# Patient Record
Sex: FEMALE | Race: BLACK OR AFRICAN AMERICAN | NOT HISPANIC OR LATINO | URBAN - METROPOLITAN AREA
[De-identification: names, ages, dates, MRNs, and addresses within clinical notes are randomized per-mention and may not be internally consistent; named-entity substitution may affect disease eponyms.]

---

## 2022-07-13 ENCOUNTER — EMERGENCY (EMERGENCY)
Facility: HOSPITAL | Age: 42
LOS: 1 days | Discharge: HOME | End: 2022-07-13
Attending: EMERGENCY MEDICINE | Admitting: EMERGENCY MEDICINE

## 2022-07-13 VITALS
OXYGEN SATURATION: 97 % | HEART RATE: 71 BPM | SYSTOLIC BLOOD PRESSURE: 106 MMHG | TEMPERATURE: 98 F | RESPIRATION RATE: 18 BRPM | DIASTOLIC BLOOD PRESSURE: 70 MMHG

## 2022-07-13 VITALS
SYSTOLIC BLOOD PRESSURE: 122 MMHG | DIASTOLIC BLOOD PRESSURE: 63 MMHG | RESPIRATION RATE: 18 BRPM | OXYGEN SATURATION: 100 % | TEMPERATURE: 99 F | HEART RATE: 79 BPM

## 2022-07-13 LAB
ALBUMIN SERPL ELPH-MCNC: 4.6 G/DL — SIGNIFICANT CHANGE UP (ref 3.3–5)
ALP SERPL-CCNC: 46 U/L — SIGNIFICANT CHANGE UP (ref 40–120)
ALT FLD-CCNC: 21 U/L — SIGNIFICANT CHANGE UP (ref 4–33)
ANION GAP SERPL CALC-SCNC: 11 MMOL/L — SIGNIFICANT CHANGE UP (ref 7–14)
APPEARANCE UR: CLEAR — SIGNIFICANT CHANGE UP
AST SERPL-CCNC: 19 U/L — SIGNIFICANT CHANGE UP (ref 4–32)
BACTERIA # UR AUTO: NEGATIVE — SIGNIFICANT CHANGE UP
BASOPHILS # BLD AUTO: 0.02 K/UL — SIGNIFICANT CHANGE UP (ref 0–0.2)
BASOPHILS NFR BLD AUTO: 0.4 % — SIGNIFICANT CHANGE UP (ref 0–2)
BILIRUB SERPL-MCNC: 0.5 MG/DL — SIGNIFICANT CHANGE UP (ref 0.2–1.2)
BILIRUB UR-MCNC: NEGATIVE — SIGNIFICANT CHANGE UP
BLD GP AB SCN SERPL QL: NEGATIVE — SIGNIFICANT CHANGE UP
BUN SERPL-MCNC: 14 MG/DL — SIGNIFICANT CHANGE UP (ref 7–23)
CALCIUM SERPL-MCNC: 9 MG/DL — SIGNIFICANT CHANGE UP (ref 8.4–10.5)
CHLORIDE SERPL-SCNC: 107 MMOL/L — SIGNIFICANT CHANGE UP (ref 98–107)
CO2 SERPL-SCNC: 23 MMOL/L — SIGNIFICANT CHANGE UP (ref 22–31)
COLOR SPEC: YELLOW — SIGNIFICANT CHANGE UP
CREAT SERPL-MCNC: 0.75 MG/DL — SIGNIFICANT CHANGE UP (ref 0.5–1.3)
DIFF PNL FLD: NEGATIVE — SIGNIFICANT CHANGE UP
EGFR: 103 ML/MIN/1.73M2 — SIGNIFICANT CHANGE UP
EOSINOPHIL # BLD AUTO: 0.01 K/UL — SIGNIFICANT CHANGE UP (ref 0–0.5)
EOSINOPHIL NFR BLD AUTO: 0.2 % — SIGNIFICANT CHANGE UP (ref 0–6)
EPI CELLS # UR: 2 /HPF — SIGNIFICANT CHANGE UP (ref 0–5)
GLUCOSE SERPL-MCNC: 88 MG/DL — SIGNIFICANT CHANGE UP (ref 70–99)
GLUCOSE UR QL: NEGATIVE — SIGNIFICANT CHANGE UP
HCG SERPL-ACNC: 285.8 MIU/ML — SIGNIFICANT CHANGE UP
HCT VFR BLD CALC: 41.1 % — SIGNIFICANT CHANGE UP (ref 34.5–45)
HGB BLD-MCNC: 12.7 G/DL — SIGNIFICANT CHANGE UP (ref 11.5–15.5)
HYALINE CASTS # UR AUTO: 0 /LPF — SIGNIFICANT CHANGE UP (ref 0–7)
IANC: 2.53 K/UL — SIGNIFICANT CHANGE UP (ref 1.8–7.4)
IMM GRANULOCYTES NFR BLD AUTO: 0.4 % — SIGNIFICANT CHANGE UP (ref 0–1.5)
KETONES UR-MCNC: NEGATIVE — SIGNIFICANT CHANGE UP
LEUKOCYTE ESTERASE UR-ACNC: NEGATIVE — SIGNIFICANT CHANGE UP
LYMPHOCYTES # BLD AUTO: 1.75 K/UL — SIGNIFICANT CHANGE UP (ref 1–3.3)
LYMPHOCYTES # BLD AUTO: 38 % — SIGNIFICANT CHANGE UP (ref 13–44)
MCHC RBC-ENTMCNC: 28.9 PG — SIGNIFICANT CHANGE UP (ref 27–34)
MCHC RBC-ENTMCNC: 30.9 GM/DL — LOW (ref 32–36)
MCV RBC AUTO: 93.4 FL — SIGNIFICANT CHANGE UP (ref 80–100)
MONOCYTES # BLD AUTO: 0.27 K/UL — SIGNIFICANT CHANGE UP (ref 0–0.9)
MONOCYTES NFR BLD AUTO: 5.9 % — SIGNIFICANT CHANGE UP (ref 2–14)
NEUTROPHILS # BLD AUTO: 2.53 K/UL — SIGNIFICANT CHANGE UP (ref 1.8–7.4)
NEUTROPHILS NFR BLD AUTO: 55.1 % — SIGNIFICANT CHANGE UP (ref 43–77)
NITRITE UR-MCNC: NEGATIVE — SIGNIFICANT CHANGE UP
NRBC # BLD: 0 /100 WBCS — SIGNIFICANT CHANGE UP
NRBC # FLD: 0 K/UL — SIGNIFICANT CHANGE UP
PH UR: 6 — SIGNIFICANT CHANGE UP (ref 5–8)
PLATELET # BLD AUTO: 234 K/UL — SIGNIFICANT CHANGE UP (ref 150–400)
POTASSIUM SERPL-MCNC: 4.1 MMOL/L — SIGNIFICANT CHANGE UP (ref 3.5–5.3)
POTASSIUM SERPL-SCNC: 4.1 MMOL/L — SIGNIFICANT CHANGE UP (ref 3.5–5.3)
PROT SERPL-MCNC: 7.3 G/DL — SIGNIFICANT CHANGE UP (ref 6–8.3)
PROT UR-MCNC: ABNORMAL
RBC # BLD: 4.4 M/UL — SIGNIFICANT CHANGE UP (ref 3.8–5.2)
RBC # FLD: 12.7 % — SIGNIFICANT CHANGE UP (ref 10.3–14.5)
RBC CASTS # UR COMP ASSIST: 6 /HPF — HIGH (ref 0–4)
RH IG SCN BLD-IMP: POSITIVE — SIGNIFICANT CHANGE UP
SODIUM SERPL-SCNC: 141 MMOL/L — SIGNIFICANT CHANGE UP (ref 135–145)
SP GR SPEC: 1.03 — SIGNIFICANT CHANGE UP (ref 1–1.05)
UROBILINOGEN FLD QL: ABNORMAL
WBC # BLD: 4.6 K/UL — SIGNIFICANT CHANGE UP (ref 3.8–10.5)
WBC # FLD AUTO: 4.6 K/UL — SIGNIFICANT CHANGE UP (ref 3.8–10.5)
WBC UR QL: 1 /HPF — SIGNIFICANT CHANGE UP (ref 0–5)

## 2022-07-13 PROCEDURE — 76830 TRANSVAGINAL US NON-OB: CPT | Mod: 26

## 2022-07-13 PROCEDURE — 99283 EMERGENCY DEPT VISIT LOW MDM: CPT | Mod: GC

## 2022-07-13 PROCEDURE — 99285 EMERGENCY DEPT VISIT HI MDM: CPT

## 2022-07-13 RX ORDER — ACETAMINOPHEN 500 MG
650 TABLET ORAL ONCE
Refills: 0 | Status: COMPLETED | OUTPATIENT
Start: 2022-07-13 | End: 2022-07-13

## 2022-07-13 RX ADMIN — Medication 650 MILLIGRAM(S): at 10:02

## 2022-07-13 NOTE — ED PROVIDER NOTE - OBJECTIVE STATEMENT
41F no signif PMH, presents to the ED with R sided pelvic/suprapubic pain that started 3 days ago, associated with one episode vaginal spotting 3 days ago, none since. Pts LMP 5/31, upreg positive at urine test done in the bathroom in the ED 10 minutes prior to exam. Pt took tylenol last night with mild relief. Denies any fevers/chills, uri sxs, cough, chest pain, lightheadedness, palpitations, n/v/d, dark/bloody stools, urinary sxs. No  active vaginal bleeding at this time, denies vaginal discharge.

## 2022-07-13 NOTE — ED PROVIDER NOTE - NSFOLLOWUPINSTRUCTIONS_ED_ALL_ED_FT
- Patient to follow up in 48 hours for repeat b-HCG in clinic (information below). If you are unable to follow up in the clinic, please return to the Emergency Department to have your testing done in 48 hours (on 7/15).     [facility]  [facility] [place] [address]  [phone]    - Be sure to return to the ED if you develop new or worsening symptoms. Specific signs and symptoms to be vigilant of: fever or chills, chest pain, difficulty breathing, palpitations, lightheadedness or loss of consciousness, significant vaginal bleeding, abdominal pain, or any other distressing symptoms.

## 2022-07-13 NOTE — CONSULT NOTE ADULT - ASSESSMENT
Differential includes threatened AB vs. ectopic pregnancy vs. viable IUP vs. spontaneous  in progress.  Difficult to assess at this time.      - Patient to follow up in 48 hours for repeat b-HCG in clinic... in the ER  - Rh type:  positive.    No need for rhogam at this time.   - Ectopic precautions reviewed with patient.  Discussed with patient importance of follow up for b-HCG given unknown pregnancy location at this time.  Patient expressed understanding.  All questions and concerns addressed to patient's apparent satisfaction.   - Patient to be added to GYN b-HCG list for closer follow up.    - Patient stable for d/c home from GYN perspective.  Primary management per ED team.      Jaz Reynolds PGY3 41y  LMP  presents with 3 days of abdominal cramping, spotting x1 day and positive pregnancy test today. Upon arrival to ED VS stable, bHCG 285, and TVUS with thickened endometrial lining and pregnancy of unknown location. Differential includes threatened AB vs. ectopic pregnancy vs. viable IUP vs. spontaneous  in progress.  Difficult to assess at this time.      - Patient to follow up in 48 hours for repeat b-HCG in clinic.   - Rh type: positive.  No need for rhogam at this time.   - Ectopic precautions reviewed with patient.  Discussed with patient importance of follow up for b-HCG given unknown pregnancy location at this time.  Patient expressed understanding.  All questions and concerns addressed to patient's apparent satisfaction.   - Patient to be added to GYN b-HCG list for closer follow up.    - Patient stable for d/c home from GYN perspective.  Primary management per ED team.      [facility]  Oncology [facility], [facility]  [address] [address]  [phone]    Seen and d/w Dr. Berenice Reynolds PGY3     41y  LMP  presents with 3 days of abdominal cramping, spotting x1 day and positive pregnancy test today. Upon arrival to ED VS stable, bHCG 285, and TVUS with thickened endometrial lining and pregnancy of unknown location. Differential includes threatened AB vs. ectopic pregnancy vs. viable IUP vs. spontaneous  in progress.  Difficult to assess at this time.      - Patient to follow up in 48 hours for repeat b-HCG in clinic.   - Rh type: positive.  No need for rhogam at this time.   - Ectopic precautions reviewed with patient.  Discussed with patient importance of follow up for b-HCG given unknown pregnancy location at this time.  Patient expressed understanding.  All questions and concerns addressed to patient's apparent satisfaction.   - Patient to be added to GYN b-HCG list for closer follow up.    - Patient stable for d/c home from GYN perspective.  Primary management per ED team.      [facility]  Oncology [facility], [facility]  [address] [address]  [phone]    Seen and d/w Dr. Berenice Reynolds PGY3

## 2022-07-13 NOTE — ED PROVIDER NOTE - PATIENT PORTAL LINK FT
You can access the FollowMyHealth Patient Portal offered by [facility] by registering at the following website: [email or web address]. By joining [employer]’s FollowMyHealth portal, you will also be able to view your health information using other applications (apps) compatible with our system.

## 2022-07-13 NOTE — ED PROVIDER NOTE - PROGRESS NOTE DETAILS
Kendall, Med Tox Fellow: US without confirmed IUP, pts hcg in 200s, paged OB to put pt on beta list Kendall, Med Tox Fellow: ob aware, to see pt then pt tbdc

## 2022-07-13 NOTE — ED PROVIDER NOTE - CARE PLAN
1 Principal Discharge DX:	Pain pelvic   Principal Discharge DX:	Pregnancy of unknown anatomic location

## 2022-07-13 NOTE — ED PROVIDER NOTE - ATTENDING CONTRIBUTION TO CARE
I performed a face-to-face evaluation of the patient and performed a history and physical examination. I agree with the history and physical examination. If this was a PA visit, I personally saw the patient with the PA and performed a substantive portion of the visit including all aspects of the medical decision making.    For the last several days, patient has had pain just to the right of a suprapubic area. No symptoms of pregnancy. No  symptoms. No known fibroids or ovarian cyst. No fever/vomiting/diarrhea. Mild tenderness just to the right of the suprapubic area. Will check your in for pregnancy and UTI and renal stone. Check transvaginal ultrasound. If unremarkable, consider CT to look for appendicitis. I performed a face-to-face evaluation of the patient and performed a history and physical examination. I agree with the history and physical examination. If this was a PA visit, I personally saw the patient with the PA and performed a substantive portion of the visit including all aspects of the medical decision making.    For the last several days, patient has had pain just to the right of a suprapubic area. No symptoms of pregnancy. No  symptoms. No known fibroids or ovarian cyst. No fever/vomiting/diarrhea. Mild tenderness just to the right of the suprapubic area. Will check your in for pregnancy and UTI and renal stone. Check HCG and transvaginal ultrasound. If unremarkable, consider CT to look for appendicitis.

## 2022-07-13 NOTE — ED ADULT TRIAGE NOTE - CHIEF COMPLAINT QUOTE
Pt c/o RLQ pain describes as cramping. Denies urinary symptoms, n/v/d. Reports LMP "end of may". Denies PMHX/Meds.

## 2022-07-13 NOTE — CONSULT NOTE ADULT - SUBJECTIVE AND OBJECTIVE BOX
GYN Consult Note    41y G_P_ presents with   HPI:      OB/GYN HISTORY:   G1:   G2:     Last Menstrual Period    Name of GYN Physician:  Date of Last Pap:  History of Abnormal Pap:  Date of Last Mammogram:  Date of Last Colonoscopy:  Current OCP use:     PAST MEDICAL & SURGICAL HISTORY:      REVIEW OF SYSTEMS  General: denies fevers, chills, tiredness  Skin/Breast: denies breast pain  Respiratory and Thorax: denies shortness of breath, denies cough  Cardiovascular: denies chest pain and denies palpitations  Gastrointestinal: denies abdominal pain, nausea/ vomiting	  Genitourinary: denies dysuria, increased urinary frequency, urgency	  Constitutional, Cardiovascular, Respiratory, Gastrointestinal, Genitourinary, Musculoskeletal and Integumentary review of systems are normal except as noted. 	    MEDICATIONS  (STANDING):    MEDICATIONS  (PRN):      Allergies    No Known Allergies    Intolerances        SOCIAL HISTORY:    FAMILY HISTORY:      Vital Signs Last 24 Hrs  T(C): 36.9 (2022 13:09), Max: 37.1 (2022 07:57)  T(F): 98.4 (2022 13:09), Max: 98.7 (2022 07:57)  HR: 71 (2022 13:09) (71 - 79)  BP: 106/70 (2022 13:09) (106/70 - 122/63)  BP(mean): --  RR: 18 (2022 13:09) (18 - 18)  SpO2: 97% (2022 13:09) (97% - 100%)    Parameters below as of 2022 13:09  Patient On (Oxygen Delivery Method): room air        PHYSICAL EXAM:   Gen: NAD, alert and oriented x 3  Cardiovascular: regular   Respiratory: breathing comfortably on RA  Abd: soft, non tender, non-distended  Pelvic: closed/long, no CMT, Uterus: normal size, non tender  Adnexa: non tender, no palpable masses  Extremities: NTBL  Skin: warm and well perfused      LABS:                        12.7   4.60  )-----------( 234      ( 2022 10:30 )             41.1     07-13    141  |  107  |  14  ----------------------------<  88  4.1   |  23  |  0.75    Ca    9.0      2022 10:30    TPro  7.3  /  Alb  4.6  /  TBili  0.5  /  DBili  x   /  AST  19  /  ALT  21  /  AlkPhos  46  07-13      Urinalysis Basic - ( 2022 10:11 )    Color: Yellow / Appearance: Clear / S.032 / pH: x  Gluc: x / Ketone: Negative  / Bili: Negative / Urobili: 3 mg/dL   Blood: x / Protein: Trace / Nitrite: Negative   Leuk Esterase: Negative / RBC: 6 /HPF / WBC 1 /HPF   Sq Epi: x / Non Sq Epi: 2 /HPF / Bacteria: Negative        RADIOLOGY & ADDITIONAL STUDIES: GYN Consult Note    HPI:  41y  LMP  presents with 3 days of abdominal cramping, spotting x1 day and positive pregnancy test today. The patient denies any fevers, headaches, CP, SOB, abdominal pain, N/V, dysuria and edema    OB/GYN HISTORY:    x4  eTOP x1, SAB x1, ectopic x1    Name of GYN Physician: Doesn't have   Denies known fibroids, cysts, abnormal paps, STIs     PAST MEDICAL & SURGICAL HISTORY:  Breast biopsy - benign     REVIEW OF SYSTEMS  General: denies fevers, chills, tiredness  Skin/Breast: denies breast pain  Respiratory and Thorax: denies shortness of breath, denies cough  Cardiovascular: denies chest pain and denies palpitations  Gastrointestinal: +abd cramping denies abdominal pain, nausea/ vomiting	  Genitourinary: denies dysuria, increased urinary frequency, urgency	  Constitutional, Cardiovascular, Respiratory, Gastrointestinal, Genitourinary, Musculoskeletal and Integumentary review of systems are normal except as noted. 	    MEDICATIONS: None     Allergies  No Known Allergies    SOCIAL HISTORY:  Denies toxic habits, anxiety and depression       Vital Signs Last 24 Hrs  T(C): 36.9 (2022 13:09), Max: 37.1 (2022 07:57)  T(F): 98.4 (2022 13:09), Max: 98.7 (2022 07:57)  HR: 71 (2022 13:09) (71 - 79)  BP: 106/70 (2022 13:09) (106/70 - 122/63)  BP(mean): --  RR: 18 (2022 13:09) (18 - 18)  SpO2: 97% (2022 13:09) (97% - 100%)    Parameters below as of 2022 13:09  Patient On (Oxygen Delivery Method): room air        PHYSICAL EXAM:   Gen: NAD, alert and oriented x 3  Cardiovascular: regular   Respiratory: breathing comfortably on RA  Abd: soft, non tender, non-distended  Pelvic: closed/long, no CMT, Uterus: normal size, non tender  Adnexa: non tender, no palpable masses  Extremities: NTBL  Skin: warm and well perfused      LABS:                        12.7   4.60  )-----------( 234      ( 2022 10:30 )             41.1     07-13    141  |  107  |  14  ----------------------------<  88  4.1   |  23  |  0.75    Ca    9.0      2022 10:30    TPro  7.3  /  Alb  4.6  /  TBili  0.5  /  DBili  x   /  AST  19  /  ALT  21  /  AlkPhos  46  07-13      Urinalysis Basic - ( 2022 10:11 )    Color: Yellow / Appearance: Clear / S.032 / pH: x  Gluc: x / Ketone: Negative  / Bili: Negative / Urobili: 3 mg/dL   Blood: x / Protein: Trace / Nitrite: Negative   Leuk Esterase: Negative / RBC: 6 /HPF / WBC 1 /HPF   Sq Epi: x / Non Sq Epi: 2 /HPF / Bacteria: Negative    Type + Screen (22 @ 10:29)    ABO Interpretation: AB    Rh Interpretation: Positive    Antibody Screen: Negative      HCG Quantitative, Serum (22 @ 10:30)    HCG Quantitative, Serum: 285.8: For pregnancy evaluation the reference values are as follows:  Negative: <5 mIU/mL  Indeterminate: 5-25 mIU/mL (suggest repeat testing in 72hrs)  Positive: >25 mIU/mL  Note: hCG results of false positive and false negative for pregnancy are  rare, but can occur with this, and other hCG tests. Fabiana- and  post-menopausal females may have mildly elevated hCG concentrations,  usually less than 14 mIU/mL, that are constant over time.  Weeks of pregnancy:           mIU/mL  ------------------         -------          3                                6 -      71          4                              10 -     750          5                             220 -   7,100          6                             160 -  32,000          7                3,700 - 164,000          8                          32,000 - 150,000          9                          64,000 - 151,000         10                         47,000 - 187,000         12                         28,000 - 211,000         14                         14,000 -  63,000         15                         12,000 -  71,000         16 - 18                   8,000 -  58,000 mIU/mL        RADIOLOGY & ADDITIONAL STUDIES:    < from: US Transvaginal (22 @ 12:19) >  IMPRESSION:  No intrauterine or extrauterine gestation is identified, representing a   pregnancy of unknown location. Differential includes an early normal   intrauterine pregnancy, failed pregnancy, or occult ectopic pregnancy.   Continued follow-up with serial hCG and ultrasound is recommended.    Mildly thickened endometrium measuring 18 mm, which may be related to   early pregnancy.    Intramural fibroid measuring 1.7 cm.    Normal left ovary without sonographic evidence of left ovarian torsion.    The right ovary demonstrates normal arterial flow. Discrete venous   waveforms were not identified. Findings may be technical, as the right   ovary was only visualized transabdominally and not transvaginally.      < end of copied text >

## 2022-07-13 NOTE — ED PROVIDER NOTE - CLINICAL SUMMARY MEDICAL DECISION MAKING FREE TEXT BOX
Cathryn: For the last several days, patient has had pain just to the right of a suprapubic area. No symptoms of pregnancy. No  symptoms. No known fibroids or ovarian cyst. No fever/vomiting/diarrhea. Mild tenderness just to the right of the super pubic area. Will check your in for pregnancy and UTI and renal stone. Check transvaginal ultrasound. If unremarkable, consider CT to look for appendicitis. Cathryn: For the last several days, patient has had pain just to the right of a suprapubic area. No symptoms of pregnancy. No  symptoms. No known fibroids or ovarian cyst. No fever/vomiting/diarrhea. Mild tenderness just to the right of the super pubic area. Will check your in for pregnancy and UTI and renal stone. Check transvaginal ultrasound. If unremarkable, consider CT to look for appendicitis.    Vishnu Argueta Tox Fellow: saw pt after attending (pt did urine preg test after attending saw pt), tested positive, never had US confirming IUP, no active vaginal bleeding, will do labs, type, hcg quant, tylenol, TVUS to eval iup/ect. Reassess. Cathryn: For the last several days, patient has had pain just to the right of a suprapubic area. No symptoms of pregnancy. No  symptoms. No known fibroids or ovarian cyst. No fever/vomiting/diarrhea. Mild tenderness just to the right of the super pubic area. Will check your in for pregnancy and UTI and renal stone. Check HCG and transvaginal ultrasound. If unremarkable, consider CT to look for appendicitis.    Vishnu Argueta Tox Fellow: saw pt after attending (pt did urine preg test after attending saw pt), tested positive, never had US confirming IUP, no active vaginal bleeding, will do labs, type, hcg quant, tylenol, TVUS to eval iup/ect. Reassess.

## 2022-07-13 NOTE — ED ADULT NURSE NOTE - OBJECTIVE STATEMENT
pt received in room 9. pt 41y female Axox4 and ambulatory. pt c/o RUQ pain. pt states last menstrual period was end of may. pt denies n/v/d, discharge, bleeding, urinary symptoms. pt took home pregnancy test that came back positive and was concerned so came to ED for further eval. Labs drawn and sent. 20G iv in th LAC. Will continue to monitor.

## 2022-07-13 NOTE — ED ADULT NURSE NOTE - NSIMPLEMENTINTERV_GEN_ALL_ED
Implemented All Universal Safety Interventions:  [place] to call system. Call bell, personal items and telephone within reach. Instruct patient to call for assistance. Room bathroom lighting operational. Non-slip footwear when patient is off stretcher. Physically safe environment: no spills, clutter or unnecessary equipment. Stretcher in lowest position, wheels locked, appropriate side rails in place.

## 2022-07-13 NOTE — ED PROVIDER NOTE - PHYSICAL EXAMINATION
GENERAL: no acute distress, non-toxic appearing  HEENT: normal conjunctiva, oral mucosa moist  CARDIAC: regular rate and regular rhythm  PULM: clear to ascultation bilaterally, no incr wob  GI: abdomen nondistended, soft, nontender  : no CVA tenderness, some R sided suprapubic tenderness  NEURO: alert and oriented x 3, normal speech, moving all extremities without lateralization  MSK: no visible deformities, no peripheral edema  SKIN: no visible rashes  PSYCH: appropriate mood and affect

## 2022-07-14 ENCOUNTER — EMERGENCY (EMERGENCY)
Facility: HOSPITAL | Age: 42
LOS: 1 days | Discharge: HOME | End: 2022-07-14
Admitting: STUDENT IN AN ORGANIZED HEALTH CARE EDUCATION/TRAINING PROGRAM

## 2022-07-14 VITALS
DIASTOLIC BLOOD PRESSURE: 76 MMHG | HEART RATE: 82 BPM | SYSTOLIC BLOOD PRESSURE: 118 MMHG | OXYGEN SATURATION: 100 % | RESPIRATION RATE: 18 BRPM | TEMPERATURE: 97 F

## 2022-07-14 LAB
ALBUMIN SERPL ELPH-MCNC: 4 G/DL — SIGNIFICANT CHANGE UP (ref 3.3–5)
ALP SERPL-CCNC: 49 U/L — SIGNIFICANT CHANGE UP (ref 40–120)
ALT FLD-CCNC: 15 U/L — SIGNIFICANT CHANGE UP (ref 4–33)
ANION GAP SERPL CALC-SCNC: 12 MMOL/L — SIGNIFICANT CHANGE UP (ref 7–14)
APPEARANCE UR: ABNORMAL
AST SERPL-CCNC: 18 U/L — SIGNIFICANT CHANGE UP (ref 4–32)
BASOPHILS # BLD AUTO: 0.03 K/UL — SIGNIFICANT CHANGE UP (ref 0–0.2)
BASOPHILS NFR BLD AUTO: 0.4 % — SIGNIFICANT CHANGE UP (ref 0–2)
BILIRUB SERPL-MCNC: 0.5 MG/DL — SIGNIFICANT CHANGE UP (ref 0.2–1.2)
BILIRUB UR-MCNC: NEGATIVE — SIGNIFICANT CHANGE UP
BUN SERPL-MCNC: 13 MG/DL — SIGNIFICANT CHANGE UP (ref 7–23)
CALCIUM SERPL-MCNC: 9 MG/DL — SIGNIFICANT CHANGE UP (ref 8.4–10.5)
CHLORIDE SERPL-SCNC: 107 MMOL/L — SIGNIFICANT CHANGE UP (ref 98–107)
CO2 SERPL-SCNC: 19 MMOL/L — LOW (ref 22–31)
COLOR SPEC: ABNORMAL
CREAT SERPL-MCNC: 0.7 MG/DL — SIGNIFICANT CHANGE UP (ref 0.5–1.3)
CULTURE RESULTS: SIGNIFICANT CHANGE UP
DIFF PNL FLD: ABNORMAL
EGFR: 112 ML/MIN/1.73M2 — SIGNIFICANT CHANGE UP
EOSINOPHIL # BLD AUTO: 0.03 K/UL — SIGNIFICANT CHANGE UP (ref 0–0.5)
EOSINOPHIL NFR BLD AUTO: 0.4 % — SIGNIFICANT CHANGE UP (ref 0–6)
GLUCOSE SERPL-MCNC: 85 MG/DL — SIGNIFICANT CHANGE UP (ref 70–99)
GLUCOSE UR QL: NEGATIVE — SIGNIFICANT CHANGE UP
HCT VFR BLD CALC: 38.2 % — SIGNIFICANT CHANGE UP (ref 34.5–45)
HGB BLD-MCNC: 12.1 G/DL — SIGNIFICANT CHANGE UP (ref 11.5–15.5)
IANC: 3.7 K/UL — SIGNIFICANT CHANGE UP (ref 1.8–7.4)
IMM GRANULOCYTES NFR BLD AUTO: 0.3 % — SIGNIFICANT CHANGE UP (ref 0–1.5)
KETONES UR-MCNC: ABNORMAL
LEUKOCYTE ESTERASE UR-ACNC: ABNORMAL
LYMPHOCYTES # BLD AUTO: 2.74 K/UL — SIGNIFICANT CHANGE UP (ref 1–3.3)
LYMPHOCYTES # BLD AUTO: 38.5 % — SIGNIFICANT CHANGE UP (ref 13–44)
MCHC RBC-ENTMCNC: 28.9 PG — SIGNIFICANT CHANGE UP (ref 27–34)
MCHC RBC-ENTMCNC: 31.7 GM/DL — LOW (ref 32–36)
MCV RBC AUTO: 91.2 FL — SIGNIFICANT CHANGE UP (ref 80–100)
MONOCYTES # BLD AUTO: 0.6 K/UL — SIGNIFICANT CHANGE UP (ref 0–0.9)
MONOCYTES NFR BLD AUTO: 8.4 % — SIGNIFICANT CHANGE UP (ref 2–14)
NEUTROPHILS # BLD AUTO: 3.7 K/UL — SIGNIFICANT CHANGE UP (ref 1.8–7.4)
NEUTROPHILS NFR BLD AUTO: 52 % — SIGNIFICANT CHANGE UP (ref 43–77)
NITRITE UR-MCNC: NEGATIVE — SIGNIFICANT CHANGE UP
NRBC # BLD: 0 /100 WBCS — SIGNIFICANT CHANGE UP
NRBC # FLD: 0 K/UL — SIGNIFICANT CHANGE UP
PH UR: 6 — SIGNIFICANT CHANGE UP (ref 5–8)
PLATELET # BLD AUTO: 204 K/UL — SIGNIFICANT CHANGE UP (ref 150–400)
POTASSIUM SERPL-MCNC: 3.7 MMOL/L — SIGNIFICANT CHANGE UP (ref 3.5–5.3)
POTASSIUM SERPL-SCNC: 3.7 MMOL/L — SIGNIFICANT CHANGE UP (ref 3.5–5.3)
PROT SERPL-MCNC: 7.6 G/DL — SIGNIFICANT CHANGE UP (ref 6–8.3)
PROT UR-MCNC: ABNORMAL
RBC # BLD: 4.19 M/UL — SIGNIFICANT CHANGE UP (ref 3.8–5.2)
RBC # FLD: 12.8 % — SIGNIFICANT CHANGE UP (ref 10.3–14.5)
SODIUM SERPL-SCNC: 138 MMOL/L — SIGNIFICANT CHANGE UP (ref 135–145)
SP GR SPEC: 1.04 — SIGNIFICANT CHANGE UP (ref 1–1.05)
SPECIMEN SOURCE: SIGNIFICANT CHANGE UP
UROBILINOGEN FLD QL: SIGNIFICANT CHANGE UP
WBC # BLD: 7.12 K/UL — SIGNIFICANT CHANGE UP (ref 3.8–10.5)
WBC # FLD AUTO: 7.12 K/UL — SIGNIFICANT CHANGE UP (ref 3.8–10.5)

## 2022-07-14 PROCEDURE — 76817 TRANSVAGINAL US OBSTETRIC: CPT | Mod: 26

## 2022-07-14 PROCEDURE — 99285 EMERGENCY DEPT VISIT HI MDM: CPT

## 2022-07-14 NOTE — ED PROVIDER NOTE - NSFOLLOWUPINSTRUCTIONS_ED_ALL_ED_FT
Advance activity as tolerated.  Continue all previously prescribed medications as directed unless otherwise instructed.  Practice pelvic rest -- no sexual intercourse, no foreign bodies, no tampons in vagina.  Follow up in the emergency department tomorrow to have repeat hcg checked- bring copies of your results.  Return to the ER for worsening or persistent symptoms, including but not limited to worsening/persistent pain, fevers, heavy vaginal bleeding requiring more than or equal to 2 pads in 1 hour, passing out, and/or ANY NEW OR CONCERNING SYMPTOMS. If you have issues obtaining follow up, please call: [phone] to obtain a doctor or specialist who takes your insurance in your area.  You may call [phone] to make an appointment with the internal medicine clinic. Advance activity as tolerated.  Continue all previously prescribed medications as directed unless otherwise instructed.  Practice pelvic rest -- no sexual intercourse, no foreign bodies, no tampons in vagina.  Follow up in the emergency department on 7/15/22 to have repeat hcg checked- bring copies of your results.  Return to the ER for worsening or persistent symptoms, including but not limited to worsening/persistent pain, fevers, heavy vaginal bleeding requiring more than or equal to 2 pads in 1 hour, passing out, and/or ANY NEW OR CONCERNING SYMPTOMS. If you have issues obtaining follow up, please call: [phone] to obtain a doctor or specialist who takes your insurance in your area.  You may call [phone] to make an appointment with the internal medicine clinic.

## 2022-07-14 NOTE — ED PROVIDER NOTE - PROGRESS NOTE DETAILS
PORFIRIO CANO:  Sono with following impression: "Pregnancy of unknown location. Differential diagnosis includes a   potential early intrauterine gestation, failed gestation or ectopic   pregnancy that is too small to be currently resolved by ultrasound.   Correlate with serial beta hCGs and follow-up pelvic ultrasound as   clinically warranted."  Pt to follow up in ED on 7/15/22 for repeat HCG.  Pt medically stable for discharge.  Strict return precautions given.  Pt to follow up with PMD and OB/GYN (referral list provided).

## 2022-07-14 NOTE — ED PROVIDER NOTE - PATIENT PORTAL LINK FT
You can access the FollowMyHealth Patient Portal offered by [facility] by registering at the following website: [email or web address]. By joining [employer]’s FollowMyHealth portal, you will also be able to view your health information using other applications (apps) compatible with our system.

## 2022-07-14 NOTE — ED PROVIDER NOTE - OBJECTIVE STATEMENT
Pt is a 40 y/o F no pertinent PMHx  LMP 22 presents with 4 days of pelvic cramping and vaginal bleeding today.  Pt reports she had spotting yesterday, which developed into slightly heavier bleeding, requiring no more then 1 pad so far today.  Pt was seen in ED yesterday; had hcg of 285.8, found to be Rh+; and had sono with following impression: "No intrauterine or extrauterine gestation is identified, representing a  pregnancy of unknown location. Differential includes an early normal intrauterine pregnancy, failed pregnancy, or occult ectopic pregnancy. Continued follow-up with serial hCG and ultrasound is recommended. Mildly thickened endometrium measuring 18 mm, which may be related to early pregnancy. Intramural fibroid measuring 1.7 cm. Normal left ovary without sonographic evidence of left ovarian torsion. The right ovary demonstrates normal arterial flow. Discrete venous waveforms were not identified. Findings may be technical, as the right ovary was only visualized transabdominally and not transvaginally."  Pt denies any fevers, chills, chest pain, SOB, n/v/d/c, dysuria, cloudy urine, vaginal discharge.

## 2022-07-14 NOTE — ED PROVIDER NOTE - CLINICAL SUMMARY MEDICAL DECISION MAKING FREE TEXT BOX
Pt is a 42 y/o F no pertinent PMHx  LMP 22 presents with 4 days of pelvic cramping and vaginal bleeding today. -- vaginal bleeding/cramping in pregnancy -- labs, ua, ucx Pt is a 40 y/o F no pertinent PMHx  LMP 22 presents with 4 days of pelvic cramping and vaginal bleeding today. -- vaginal bleeding/cramping in pregnancy -- labs, ua, ucx, TVUS

## 2022-07-14 NOTE — ED ADULT NURSE NOTE - TEMPLATE
Called patient and spoke with his wife Jacki. To let them know that the MRI of the brain w/wo contrast was approved by the insurance company and that [facility] have all the information. Katerin at the facility will be giving them a call to schedule the appointment..   General

## 2022-07-14 NOTE — ED ADULT TRIAGE NOTE - CHIEF COMPLAINT QUOTE
Pt was seen in ER yesterday . Pt was told she is pregnant. Pt states  developed vaginal bleeding today with cramps

## 2022-07-14 NOTE — ED ADULT NURSE NOTE - OBJECTIVE STATEMENT
Pt A&Ox4 ambulatory, presenting to the ED (Intake 13) c/o vaginal bleeding with abdominal cramping. Pt states was discharged from [facility] ED, was told she was pregnant. Pt states developed vaginal spotting after discharge. Pt states around 5am vaginal bleeding increased, states used 1 pad x day. Denies blood clots, cp, sob, palpitations, dizziness, lightheadedness, n/v/d, fever, chills, cough, HA, blurry vision. Respirations are even and unlabored, abdomen soft and nontender, VS noted, 20G IV LAC, labs sent, UA and urine culture sent, pending US, Safety precautions implemented as per protocol, awaiting further MD orders, will continue to monitor.

## 2022-07-14 NOTE — ED PROVIDER NOTE - CARDIAC, MLM
Continue sertraline 25 mg daily Normal rate, regular rhythm.  Heart sounds S1, S2.  No murmurs, rubs or gallops.

## 2022-07-15 ENCOUNTER — EMERGENCY (EMERGENCY)
Facility: HOSPITAL | Age: 42
LOS: 1 days | Discharge: HOME | End: 2022-07-15
Attending: STUDENT IN AN ORGANIZED HEALTH CARE EDUCATION/TRAINING PROGRAM | Admitting: STUDENT IN AN ORGANIZED HEALTH CARE EDUCATION/TRAINING PROGRAM

## 2022-07-15 VITALS
OXYGEN SATURATION: 100 % | RESPIRATION RATE: 20 BRPM | SYSTOLIC BLOOD PRESSURE: 104 MMHG | TEMPERATURE: 98 F | DIASTOLIC BLOOD PRESSURE: 65 MMHG | HEART RATE: 90 BPM

## 2022-07-15 VITALS
HEART RATE: 74 BPM | RESPIRATION RATE: 20 BRPM | TEMPERATURE: 98 F | OXYGEN SATURATION: 100 % | DIASTOLIC BLOOD PRESSURE: 74 MMHG | SYSTOLIC BLOOD PRESSURE: 113 MMHG

## 2022-07-15 VITALS
HEART RATE: 72 BPM | TEMPERATURE: 98 F | RESPIRATION RATE: 18 BRPM | DIASTOLIC BLOOD PRESSURE: 63 MMHG | SYSTOLIC BLOOD PRESSURE: 113 MMHG | OXYGEN SATURATION: 100 %

## 2022-07-15 LAB
ALBUMIN SERPL ELPH-MCNC: 4.2 G/DL — SIGNIFICANT CHANGE UP (ref 3.3–5)
ALP SERPL-CCNC: 52 U/L — SIGNIFICANT CHANGE UP (ref 40–120)
ALT FLD-CCNC: 14 U/L — SIGNIFICANT CHANGE UP (ref 4–33)
ANION GAP SERPL CALC-SCNC: 11 MMOL/L — SIGNIFICANT CHANGE UP (ref 7–14)
APPEARANCE UR: CLEAR — SIGNIFICANT CHANGE UP
AST SERPL-CCNC: 17 U/L — SIGNIFICANT CHANGE UP (ref 4–32)
BASOPHILS # BLD AUTO: 0.03 K/UL — SIGNIFICANT CHANGE UP (ref 0–0.2)
BASOPHILS NFR BLD AUTO: 0.4 % — SIGNIFICANT CHANGE UP (ref 0–2)
BILIRUB SERPL-MCNC: 0.4 MG/DL — SIGNIFICANT CHANGE UP (ref 0.2–1.2)
BILIRUB UR-MCNC: NEGATIVE — SIGNIFICANT CHANGE UP
BUN SERPL-MCNC: 15 MG/DL — SIGNIFICANT CHANGE UP (ref 7–23)
CALCIUM SERPL-MCNC: 9.3 MG/DL — SIGNIFICANT CHANGE UP (ref 8.4–10.5)
CHLORIDE SERPL-SCNC: 107 MMOL/L — SIGNIFICANT CHANGE UP (ref 98–107)
CO2 SERPL-SCNC: 23 MMOL/L — SIGNIFICANT CHANGE UP (ref 22–31)
COLOR SPEC: YELLOW — SIGNIFICANT CHANGE UP
CREAT SERPL-MCNC: 0.71 MG/DL — SIGNIFICANT CHANGE UP (ref 0.5–1.3)
DIFF PNL FLD: ABNORMAL
EGFR: 110 ML/MIN/1.73M2 — SIGNIFICANT CHANGE UP
EOSINOPHIL # BLD AUTO: 0.06 K/UL — SIGNIFICANT CHANGE UP (ref 0–0.5)
EOSINOPHIL NFR BLD AUTO: 0.8 % — SIGNIFICANT CHANGE UP (ref 0–6)
GLUCOSE SERPL-MCNC: 88 MG/DL — SIGNIFICANT CHANGE UP (ref 70–99)
GLUCOSE UR QL: NEGATIVE — SIGNIFICANT CHANGE UP
HCG SERPL-ACNC: 55.4 MIU/ML — SIGNIFICANT CHANGE UP
HCT VFR BLD CALC: 40.7 % — SIGNIFICANT CHANGE UP (ref 34.5–45)
HGB BLD-MCNC: 12.4 G/DL — SIGNIFICANT CHANGE UP (ref 11.5–15.5)
IANC: 4.21 K/UL — SIGNIFICANT CHANGE UP (ref 1.8–7.4)
IMM GRANULOCYTES NFR BLD AUTO: 0.3 % — SIGNIFICANT CHANGE UP (ref 0–1.5)
KETONES UR-MCNC: ABNORMAL
LEUKOCYTE ESTERASE UR-ACNC: NEGATIVE — SIGNIFICANT CHANGE UP
LYMPHOCYTES # BLD AUTO: 2.38 K/UL — SIGNIFICANT CHANGE UP (ref 1–3.3)
LYMPHOCYTES # BLD AUTO: 32.4 % — SIGNIFICANT CHANGE UP (ref 13–44)
MCHC RBC-ENTMCNC: 28.4 PG — SIGNIFICANT CHANGE UP (ref 27–34)
MCHC RBC-ENTMCNC: 30.5 GM/DL — LOW (ref 32–36)
MCV RBC AUTO: 93.1 FL — SIGNIFICANT CHANGE UP (ref 80–100)
MONOCYTES # BLD AUTO: 0.64 K/UL — SIGNIFICANT CHANGE UP (ref 0–0.9)
MONOCYTES NFR BLD AUTO: 8.7 % — SIGNIFICANT CHANGE UP (ref 2–14)
NEUTROPHILS # BLD AUTO: 4.21 K/UL — SIGNIFICANT CHANGE UP (ref 1.8–7.4)
NEUTROPHILS NFR BLD AUTO: 57.4 % — SIGNIFICANT CHANGE UP (ref 43–77)
NITRITE UR-MCNC: NEGATIVE — SIGNIFICANT CHANGE UP
NRBC # BLD: 0 /100 WBCS — SIGNIFICANT CHANGE UP
NRBC # FLD: 0 K/UL — SIGNIFICANT CHANGE UP
PH UR: 6 — SIGNIFICANT CHANGE UP (ref 5–8)
PLATELET # BLD AUTO: 213 K/UL — SIGNIFICANT CHANGE UP (ref 150–400)
POTASSIUM SERPL-MCNC: 4.4 MMOL/L — SIGNIFICANT CHANGE UP (ref 3.5–5.3)
POTASSIUM SERPL-SCNC: 4.4 MMOL/L — SIGNIFICANT CHANGE UP (ref 3.5–5.3)
PROT SERPL-MCNC: 7.8 G/DL — SIGNIFICANT CHANGE UP (ref 6–8.3)
PROT UR-MCNC: ABNORMAL
RBC # BLD: 4.37 M/UL — SIGNIFICANT CHANGE UP (ref 3.8–5.2)
RBC # FLD: 12.6 % — SIGNIFICANT CHANGE UP (ref 10.3–14.5)
SODIUM SERPL-SCNC: 141 MMOL/L — SIGNIFICANT CHANGE UP (ref 135–145)
SP GR SPEC: 1.04 — SIGNIFICANT CHANGE UP (ref 1–1.05)
UROBILINOGEN FLD QL: SIGNIFICANT CHANGE UP
WBC # BLD: 7.34 K/UL — SIGNIFICANT CHANGE UP (ref 3.8–10.5)
WBC # FLD AUTO: 7.34 K/UL — SIGNIFICANT CHANGE UP (ref 3.8–10.5)

## 2022-07-15 PROCEDURE — 76830 TRANSVAGINAL US NON-OB: CPT | Mod: 26

## 2022-07-15 PROCEDURE — 99285 EMERGENCY DEPT VISIT HI MDM: CPT

## 2022-07-15 NOTE — ED PROVIDER NOTE - CLINICAL SUMMARY MEDICAL DECISION MAKING FREE TEXT BOX
40 yo F presenting for bhcg check and repeat US for pregnancy unknown location- gyn paged. dispo per gyn recs

## 2022-07-15 NOTE — ED PROVIDER NOTE - PATIENT PORTAL LINK FT
You can access the FollowMyHealth Patient Portal offered by [facility] by registering at the following website: [email or web address]. By joining [employer]’s FollowMyHealth portal, you will also be able to view your health information using other applications (apps) compatible with our system.

## 2022-07-15 NOTE — ED ADULT NURSE NOTE - OBJECTIVE STATEMENT
pt A&ox4, came to ED for repeat HCG blood work. pt states she was seen in ED this week and was told to come back today for follow up blood work. pt endorses vaginal bleeding but states she has not soaked through 1 pad. pt denies H/A, Dizziness, lightheadedness, and radiating chest pain. breathing is spontaneous and unlabored. sating 99% on RA. bilateral pedal and radial pulses palpable and strong.  right ac 20g IV placed Labs drawn and sent as per ordered. Bed in lowest position, call bell within reach, all other safety and comfort measures provided. awaiting labs results and further orders.

## 2022-07-15 NOTE — CONSULT NOTE ADULT - SUBJECTIVE AND OBJECTIVE BOX
HPI:     41y   Last Menstrual Period 22 presents with abdominal cramping and vaginal spotting at 6weeks and 3 days gestational age. Patient was previously seen on  and  with similar symptoms with workup for PUL completed. Patient was instructed to follow up today for repeat beta HCG. She denies any fevers, headaches, CP, SOB, abdominal pain, N/V, dysuria and edema.    OB/GYN HISTORY:    x4  eTOP x1, SAB x1, ectopic x1    Name of GYN Physician: Doesn't have   Denies known fibroids, cysts, abnormal paps, STIs    PMHX; denies  PSHX; s/p benign breast biopsy  POBHX;  x4  eTOP x1, SAB x1, ectopic x1  PGYNHX: + fibroids denies fibroids,   SOCIAL: denies e/t/d use  Allergies: NKDA or intolerances  MEDS: denies      Vital Signs Last 24 Hrs  T(C): 36.8 (15 Jul 2022 20:49), Max: 36.8 (15 Jul 2022 20:49)  T(F): 98.3 (15 Jul 2022 20:49), Max: 98.3 (15 Jul 2022 20:49)  HR: 74 (15 Jul 2022 20:49) (74 - 90)  BP: 113/74 (15 Jul 2022 20:49) (104/65 - 113/74)  RR: 20 (15 Jul 2022 20:49) (20 - 20)  SpO2: 100% (15 Jul 2022 20:49) (100% - 100%)    Parameters below as of 15 Jul 2022 20:49  Patient On (Oxygen Delivery Method): room air       PHYSICAL EXAM:   Gen: NAD, alert and oriented x 3  Cardiovascular: regular   Respiratory: breathing comfortably on RA  Abd: soft, non tender, non-distended  Pelvic: closed/long, no CMT, minimal blood in vaginal vault   Uterus: normal size, non tender  Adnexa: non tender, no palpable masses  Extremities: NTBL  Skin: warm and well perfused      LABS:  HCG Quantitative, Serum (07.15.22 @ 17:00)   HCG Quantitative, Serum: 55.4: For pregnancy evaluation the reference values are as follows:   Negative: <5 mIU/mL   Indeterminate: 5-25 mIU/mL (suggest repeat testing in 72hrs)   Positive: >25 mIU/mL                          12.4   7.34  )-----------( 213      ( 15 Jul 2022 17:00 )             40.7     07-15    141  |  107  |  15  ----------------------------<  88  4.4   |  23  |  0.71    Ca    9.3      15 Jul 2022 17:00    TPro  7.8  /  Alb  4.2  /  TBili  0.4  /  DBili  x   /  AST  17  /  ALT  14  /  AlkPhos  52  07-15    Urinalysis Basic - ( 2022 23:07 )    Color: Yellow / Appearance: Clear / S.036 / pH: x  Gluc: x / Ketone: Large  / Bili: Negative / Urobili: <2 mg/dL   Blood: x / Protein: 30 mg/dL / Nitrite: Negative   Leuk Esterase: Negative / RBC: 27 /HPF / WBC 6 /HPF   Sq Epi: x / Non Sq Epi: 6 /HPF / Bacteria: Negative      RADIOLOGY STUDIES:  < from: US Transvaginal (07.15.22 @ 20:00) >  ACC: [id number] EXAM:  US TRANSVAGINAL                          PROCEDURE DATE:  07/15/2022          INTERPRETATION:  CLINICAL INFORMATION: Abdominal pain. Evaluate for   ectopic. Beta hCG 55 today, 286 on 2022.    LMP: 2022    COMPARISON: Pelvic ultrasound 2022.    TECHNIQUE:  Endovaginal pelvic sonogram only. Color and Spectral Doppler was   performed.    FINDINGS:  Uterus: 10.7 cm x 5.8 cm x 5.0 cm. Intramural fibroid posteriorly   measuring up to 1.3 cm diameter.  Endometrium: 13 mm. No intrauterine gestation.    Right ovary: 3.3 cm x 1.3 cm x 1.4 cm. Within normal limits. Normal   arterial and venous waveforms.  Left ovary: 2.7 cm x 1.7 cm x 1.6 cm. Within normal limits. Normal   arterial and venous waveforms.    Fluid: None.    IMPRESSION:  No intrauterine gestation. Close follow-up with serial serum beta hCG   levels and ultrasound is advised.    --- End of Report ---          PHYLLIS ESPINOZA MD; Resident Radiology  This document has been electronically signed.  NIDHI RODRIGUES MD; Attending Radiologist  This document has been electronically signed. Jul 15 2022  8:23PM    < end of copied text >

## 2022-07-15 NOTE — ED PROVIDER NOTE - PHYSICAL EXAMINATION
VITALS: reviewed  GEN: NAD, A & O x 4  HEAD/EYES: NCAT, EOMI, anicteric sclerae,   ENT: mucus membranes moist, oropharynx WNL, trachea midline,  RESP: lungs CTA with equal breath sounds bilaterally, chest wall nontender and atraumatic  CV: heart with reg rhythm S1, S2, distal pulses intact and symmetric bilaterally  ABDOMEN: soft, nondistended, nontender, no palpable masses  : no CVAT  MSK: extremities atraumatic and nontender, no edema, no asymmetry.  SKIN: warm, dry, no rash, no bruising, no cyanosis. color appropriate for ethnicity  NEURO: alert, mentating appropriately, no facial asymmetry.   PSYCH: Affect appropriate

## 2022-07-15 NOTE — CHART NOTE - NSCHARTNOTEFT_GEN_A_CORE
Pt with PUL requiring bHCG trending. Called patient today, 7/15, to remind her to follow up in the ED today for repeat bHCG level. Reports she plans on coming and will "be there soon." She will need a TVUS and bHCG. Please call GYN when patient arrives.    Akilah Ravindra PGY-1  GYN pager [phone]

## 2022-07-15 NOTE — CONSULT NOTE ADULT - ASSESSMENT
41y  LMP  presents for repeat Beta HCG check today for workup of PUL. Patient has only minimal vaginal bleeding today. HCG downtrended from 285->55. TVUS consistent showing  thickened endometrium with no IUP or adnexal masses. VSS CBC, CMP wnl.  Likely spontaneous  in progress.     - Follow up in 48 hours for repeat bHCG.  - Rh type: positive.  No need for rhogam at this time.   - Ectopic precautions reviewed with patient.  Discussed with patient importance of follow up for b-HCG given unknown pregnancy location at this time.  Patient expressed understanding.  All questions and concerns addressed to patient's apparent satisfaction.   - Continue to follow on GYN b-HCG list for closer follow up.    - Patient stable for d/c home from GYN perspective.  Primary management per ED team.      [facility]  Oncology [facility], [address] [address]  [phone]    Seen and d/w Dr. Clifton Prakash, PGY2

## 2022-07-15 NOTE — ED PROVIDER NOTE - NSFOLLOWUPINSTRUCTIONS_ED_ALL_ED_FT
Your pregnancy  level (bHCG) level is trending down- follow up in one week 7/22 for repeat bHCG check    Return to ER if your bleeding increases (more than 2 pads per hour), if you develop chest pain, shortness of breath, palpitations, dizziness.

## 2022-07-15 NOTE — ED PROVIDER NOTE - OBJECTIVE STATEMENT
40 yo F  lmp  presenting for b-hcg check and repeat US. Patient reports light spotting today, no abdominal pain. ROS otherwise negative.

## 2022-07-16 LAB
CULTURE RESULTS: SIGNIFICANT CHANGE UP
CULTURE RESULTS: SIGNIFICANT CHANGE UP
SPECIMEN SOURCE: SIGNIFICANT CHANGE UP
SPECIMEN SOURCE: SIGNIFICANT CHANGE UP

## 2022-07-17 ENCOUNTER — EMERGENCY (EMERGENCY)
Facility: HOSPITAL | Age: 42
LOS: 1 days | Discharge: HOME | End: 2022-07-17
Attending: EMERGENCY MEDICINE | Admitting: EMERGENCY MEDICINE

## 2022-07-17 VITALS
OXYGEN SATURATION: 100 % | HEART RATE: 65 BPM | TEMPERATURE: 98 F | SYSTOLIC BLOOD PRESSURE: 117 MMHG | WEIGHT: 134.92 LBS | DIASTOLIC BLOOD PRESSURE: 76 MMHG | HEIGHT: 63 IN | RESPIRATION RATE: 16 BRPM

## 2022-07-17 VITALS
TEMPERATURE: 98 F | HEART RATE: 62 BPM | OXYGEN SATURATION: 100 % | SYSTOLIC BLOOD PRESSURE: 119 MMHG | RESPIRATION RATE: 16 BRPM | DIASTOLIC BLOOD PRESSURE: 78 MMHG

## 2022-07-17 LAB — HCG SERPL-ACNC: 24.9 MIU/ML — SIGNIFICANT CHANGE UP

## 2022-07-17 PROCEDURE — 99284 EMERGENCY DEPT VISIT MOD MDM: CPT

## 2022-07-17 NOTE — ED PROVIDER NOTE - PHYSICAL EXAMINATION
Attending/Rose: NAD; PERRL/EOMI, non-icterus, supple, no LUIS M, no JVD, RRR, CTAB; Abd-soft, NT/ND, no HSM; no LE edema, A&Ox3, nonfocal; Skin-warm/dry

## 2022-07-17 NOTE — ED PROVIDER NOTE - NS ED ATTENDING STATEMENT MOD
This was a shared visit with the MIRANDA. I reviewed and verified the documentation and independently performed the documented:

## 2022-07-17 NOTE — ED PROVIDER NOTE - OBJECTIVE STATEMENT
41 year old female with no known PMH,   LMP 22 presents to the ED for follow up beta hcg. Pt was seen on the ED recently for concern of pregnancy of unknown location. She states she still has some mild spotting today but denies pain, fevers, or chills. On , her beta hcg was 285 and her TVUS showed no IUP, on  her TVUS again demonstrated no IUP, on  her beta hcg downtrended to 55 and a TVUS showed no IUP.

## 2022-07-17 NOTE — ED PROVIDER NOTE - NSDCPRINTRESULTS_ED_ALL_ED
[Negative] : Heme/Lymph Patient requests all Lab, Cardiology, and Radiology Results on their Discharge Instructions

## 2022-07-17 NOTE — ED PROVIDER NOTE - NSFOLLOWUPINSTRUCTIONS_ED_ALL_ED_FT
Follow up with your OB/GYN in 48 hrs. If you're unable to see your OB/GYN doctor, please return to the ED for repeat blood work.   Show copies of your reports given to you.   Take all of your medications as previously prescribed.    If you have any new, worsened or concerning symptoms, please return to the emergency department immediately.

## 2022-07-17 NOTE — ED PROVIDER NOTE - PATIENT PORTAL LINK FT
You can access the FollowMyHealth Patient Portal offered by [facility] by registering at the following website: [email or web address]. By joining [employer]’s FollowMyHealth portal, you will also be able to view your health information using other applications (apps) compatible with our system.

## 2022-07-17 NOTE — ED ADULT TRIAGE NOTE - CHIEF COMPLAINT QUOTE
c/o vaginal bleeding 3 days ago but now vaginal spotting. Pt was called today to return for repeat blood work. Denies any pain

## 2022-07-17 NOTE — CONSULT NOTE ADULT - ASSESSMENT
41y  LMP  presents for repeat Beta HCG check today for workup of PUL. Patient has only minimal vaginal bleeding today. HCG downtrended from 285->55->24.9 (). TVUS at prior visit consistent showing  thickened endometrium with no IUP or adnexal masses. Patient with stable vital signs and is asymptomatic.   Differential includes threatened AB vs. ectopic pregnancy vs. viable IUP vs. more likely a spontaneous  in progress.  Likely spontaneous  in progress, however remains difficult to assess at this time.   - Patient to follow up in 48 hours for repeat b-HCG in clinic.   - Emailed [facility] to schedule appt for  in GYN clinic  - Rh type: AB+. No need for rhogam at this time.   - Ectopic precautions reviewed with patient.  Discussed with patient importance of follow up for b-HCG given unknown pregnancy location at this time.  Patient expressed understanding.  All questions and concerns addressed to patient's apparent satisfaction.   - Patient to remain on GYN b-HCG list for closer follow up.    - Patient stable for d/c home from GYN perspective.     D/w attending Dr. Clifton Earl-Singh PGY2     [facility]  Oncology Building, [facility]  [address] [address]  [phone]

## 2022-07-17 NOTE — CONSULT NOTE ADULT - SUBJECTIVE AND OBJECTIVE BOX
NORAH GOINS  41y  Female [id number]    HPI: 40yo  LMP 22 presents for follow up [facility] [place]UL. Originally presented on  w/ vaginal bleeding and abdominal cramping. Reports that bleeding is very mild now with occasional spotting and denies abdominal pain. Denies fevers, chills, nausea, vomiting, dizziness, lightheadedness.     OB/GYN HISTORY:    x4  eTOP x1, SAB x1, ectopic x1    Name of GYN Physician: Doesn't have   Denies known fibroids, cysts, abnormal paps, STIs    PMHX; denies  PSHX; s/p benign breast biopsy  POBHX;  x4  eTOP x1, SAB x1, ectopic x1  PGYNHX: + fibroids denies fibroids,   SOCIAL: denies e/t/d use  Allergies: NKDA or intolerances  MEDS: denies      Vital Signs Last 24 Hrs  T(C): 36.7 (2022 18:49), Max: 36.7 (2022 18:49)  T(F): 98 (2022 18:49), Max: 98 (2022 18:49)  HR: 62 (2022 18:49) (62 - 65)  BP: 119/78 (2022 18:49) (117/76 - 119/78)  BP(mean): --  RR: 16 (2022 18:49) (16 - 16)  SpO2: 100% (2022 18:49) (100% - 100%)    Parameters below as of 2022 18:49  Patient On (Oxygen Delivery Method): room air    Physical Exam:   General: sitting comftorably in bed, NAD   Abd: Soft, non-tender, non-distended.   :  No bleeding on pad.    Ext: non-tender b/l, no edema              < from: US Transvaginal (07.15.22 @ 20:00) >  ACC: [id number] EXAM:  US TRANSVAGINAL                          PROCEDURE DATE:  07/15/2022          INTERPRETATION:  CLINICAL INFORMATION: Abdominal pain. Evaluate for   ectopic. Beta hCG 55 today, 286 on 2022.    LMP: 2022    COMPARISON: Pelvic ultrasound 2022.    TECHNIQUE:  Endovaginal pelvic sonogram only. Color and Spectral Doppler was   performed.    FINDINGS:  Uterus: 10.7 cm x 5.8 cm x 5.0 cm. Intramural fibroid posteriorly   measuring up to 1.3 cm diameter.  Endometrium: 13 mm. No intrauterine gestation.    Right ovary: 3.3 cm x 1.3 cm x 1.4 cm. Within normal limits. Normal   arterial and venous waveforms.  Left ovary: 2.7 cm x 1.7 cm x 1.6 cm. Within normal limits. Normal   arterial and venous waveforms.    Fluid: None.    IMPRESSION:  No intrauterine gestation. Close follow-up with serial serum beta hCG   levels and ultrasound is advised.    --- End of Report ---          PHYLLIS ESPINOZA MD; Resident Radiology  This document has been electronically signed.  NIDHI RODRIGUES MD; Attending Radiologist  This document has been electronically signed. Jul 15 2022  8:23PM    < end of copied text >

## 2022-07-17 NOTE — ED PROVIDER NOTE - CLINICAL SUMMARY MEDICAL DECISION MAKING FREE TEXT BOX
41 year old female with no known PMH,   LMP 22 presents to the ED for follow up beta hcg. Pt was seen on the ED recently for concern of pregnancy of unknown location. She states she still has some mild spotting today but denies pain, fevers, or chills. On , her beta hcg was 285 and her TVUS showed no IUP, on  her TVUS again demonstrated no IUP, on  her beta hcg downtrended to 55 and a TVUS showed no IUP. HD stable. Abdomen non-acute. Imp: Likely spontaneous . Plan to check beta hcg today.

## 2022-07-18 PROBLEM — Z78.9 OTHER SPECIFIED HEALTH STATUS: Chronic | Status: ACTIVE | Noted: 2022-07-15

## 2022-07-19 ENCOUNTER — EMERGENCY (EMERGENCY)
Facility: HOSPITAL | Age: 42
LOS: 1 days | Discharge: HOME | End: 2022-07-19
Attending: STUDENT IN AN ORGANIZED HEALTH CARE EDUCATION/TRAINING PROGRAM | Admitting: STUDENT IN AN ORGANIZED HEALTH CARE EDUCATION/TRAINING PROGRAM

## 2022-07-19 VITALS
HEART RATE: 65 BPM | OXYGEN SATURATION: 100 % | HEIGHT: 63 IN | RESPIRATION RATE: 16 BRPM | DIASTOLIC BLOOD PRESSURE: 65 MMHG | SYSTOLIC BLOOD PRESSURE: 109 MMHG | TEMPERATURE: 97 F

## 2022-07-19 LAB
HCG SERPL-ACNC: 12.4 MIU/ML — SIGNIFICANT CHANGE UP
HCT VFR BLD CALC: 38.2 % — SIGNIFICANT CHANGE UP (ref 34.5–45)
HGB BLD-MCNC: 12 G/DL — SIGNIFICANT CHANGE UP (ref 11.5–15.5)

## 2022-07-19 PROCEDURE — 99283 EMERGENCY DEPT VISIT LOW MDM: CPT

## 2022-07-19 NOTE — ED PROVIDER NOTE - PHYSICAL EXAMINATION
VITALS: reviewed  GEN: NAD, A & O x 4  HEAD/EYES: NCAT, EOMI, anicteric sclerae,   ENT: mucus membranes moist, oropharynx WNL, trachea midline,   RESP: unlabored breathing, ctab  CV: rrr, distal pulses intact and symmetric bilaterally  MSK: extremities atraumatic and nontender, no edema, no asymmetry.   SKIN: warm, dry, no rash, no bruising, no cyanosis. color appropriate for ethnicity  NEURO: alert, mentating appropriately, no facial asymmetry.  PSYCH: Affect appropriate

## 2022-07-19 NOTE — CONSULT NOTE ADULT - ASSESSMENT
41y  LMP  presents for repeat Beta HCG check today for workup of PUL. Patient has only minimal vaginal bleeding today. HCG downtrended from 285->55->24.9 ()->12.4 today. TVUS at prior visit consistent showing  thickened endometrium with no IUP or adnexal masses. Patient with stable vital signs and is asymptomatic.   Differential includes threatened AB vs. ectopic pregnancy vs. viable IUP vs. more likely a spontaneous  in progress.  Likely spontaneous  in progress.     - Patient to follow up to ED in 1 week for repeat b-HCG, unable to come to [facility] clinic since insurance is not accepted.   - Rh type: AB+. No need for rhogam at this time.   - Ectopic precautions reviewed with patient.  Discussed with patient importance of follow up for b-HCG given unknown pregnancy location at this time.  Patient expressed understanding.  All questions and concerns addressed to patient's apparent satisfaction.   - Patient to remain on GYN b-HCG list for closer follow up.    - Patient stable for d/c home from GYN perspective.     Amyeo Afroz Jereen, PGY-2  d/w Dr Nava

## 2022-07-19 NOTE — CONSULT NOTE ADULT - SUBJECTIVE AND OBJECTIVE BOX
NORAH GOINS  41y  Female [id number]    HPI: 42yo  LMP 22 presents for follow up [facility] for PUL. Originally presented on  w/ vaginal bleeding and abdominal cramping. Reports that bleeding is very mild now with occasional spotting and denies abdominal pain. Denies fevers, chills, nausea, vomiting, dizziness, lightheadedness.     OB/GYN HISTORY:    x4  eTOP x1, SAB x1, ectopic x1    Name of GYN Physician: Doesn't have   Denies known fibroids, cysts, abnormal paps, STIs    PMHX; denies  PSHX; s/p benign breast biopsy  POBHX;  x4  eTOP x1, SAB x1, ectopic x1  PGYNHX: + fibroids denies fibroids,   SOCIAL: denies e/t/d use  Allergies: NKDA or intolerances  MEDS: denies    Vital Signs Last 24 Hrs  T(C): 36.3 (2022 16:04), Max: 36.3 (2022 16:04)  T(F): 97.4 (2022 16:04), Max: 97.4 (2022 16:04)  HR: 65 (2022 16:04) (65 - 65)  BP: 109/65 (2022 16:04) (109/65 - 109/65)  BP(mean): --  RR: 16 (2022 16:04) (16 - 16)  SpO2: 100% (2022 16:04) (100% - 100%)    Parameters below as of 2022 16:04  Patient On (Oxygen Delivery Method): room air      Physical Exam:   General: sitting comftorably in bed, NAD   Abd: Soft, non-tender, non-distended.   :  No bleeding on pad.    Ext: non-tender b/l, no edema        LABS:                         12.0   x     )-----------( x        ( 2022 16:30 )             38.2     bHC.8 ()-> 55.4 (7/15)->24.9()->12.4()      IMAGING    < from: US Transvaginal (07.15.22 @ 20:00) >  ACC: [id number] EXAM:  US TRANSVAGINAL                          PROCEDURE DATE:  07/15/2022          INTERPRETATION:  CLINICAL INFORMATION: Abdominal pain. Evaluate for   ectopic. Beta hCG 55 today, 286 on 2022.    LMP: 2022    COMPARISON: Pelvic ultrasound 2022.    TECHNIQUE:  Endovaginal pelvic sonogram only. Color and Spectral Doppler was   performed.    FINDINGS:  Uterus: 10.7 cm x 5.8 cm x 5.0 cm. Intramural fibroid posteriorly   measuring up to 1.3 cm diameter.  Endometrium: 13 mm. No intrauterine gestation.    Right ovary: 3.3 cm x 1.3 cm x 1.4 cm. Within normal limits. Normal   arterial and venous waveforms.  Left ovary: 2.7 cm x 1.7 cm x 1.6 cm. Within normal limits. Normal   arterial and venous waveforms.    Fluid: None.    IMPRESSION:  No intrauterine gestation. Close follow-up with serial serum beta hCG   levels and ultrasound is advised.    --- End of Report ---          PHYLLIS ESPINOZA MD; Resident Radiology  This document has been electronically signed.  INDHI RODRIGUES MD; Attending Radiologist  This document has been electronically signed. Jul 15 2022  8:23PM    < end of copied text >      RADIOLOGY & ADDITIONAL STUDIES:

## 2022-07-19 NOTE — ED PROVIDER NOTE - OBJECTIVE STATEMENT
42 yo F  no pmhx presenting for beta check. LMP 22. Denies abd pain, no vaginal bleeding/spotting. No urinary sx.

## 2022-07-19 NOTE — ED PROVIDER NOTE - CLINICAL SUMMARY MEDICAL DECISION MAKING FREE TEXT BOX
41 F presenting for beta check-- vss, well appearing no complaints. pt requests birth control- appreciate gyn- will refer pt to outpatient clinic. Per gyn okay to dc without beta results back-- they will follow

## 2022-07-19 NOTE — ED PROVIDER NOTE - CROS ED RESP ALL NEG
negative... I was physically present for the key portions of the evaluation and management (E/M) service provided.  I agree with the above history, physical, and plan which I have reviewed and edited where appropriate.

## 2022-07-26 ENCOUNTER — EMERGENCY (EMERGENCY)
Facility: HOSPITAL | Age: 42
LOS: 1 days | Discharge: HOME | End: 2022-07-26
Attending: STUDENT IN AN ORGANIZED HEALTH CARE EDUCATION/TRAINING PROGRAM | Admitting: STUDENT IN AN ORGANIZED HEALTH CARE EDUCATION/TRAINING PROGRAM

## 2022-07-26 VITALS
DIASTOLIC BLOOD PRESSURE: 81 MMHG | OXYGEN SATURATION: 100 % | HEART RATE: 78 BPM | SYSTOLIC BLOOD PRESSURE: 107 MMHG | RESPIRATION RATE: 18 BRPM | TEMPERATURE: 98 F

## 2022-07-26 VITALS
TEMPERATURE: 98 F | SYSTOLIC BLOOD PRESSURE: 118 MMHG | OXYGEN SATURATION: 100 % | RESPIRATION RATE: 16 BRPM | HEART RATE: 71 BPM | DIASTOLIC BLOOD PRESSURE: 62 MMHG | HEIGHT: 63 IN

## 2022-07-26 LAB — HCG SERPL-ACNC: <5 MIU/ML — SIGNIFICANT CHANGE UP

## 2022-07-26 PROCEDURE — 99284 EMERGENCY DEPT VISIT MOD MDM: CPT

## 2022-07-26 RX ORDER — ACETAMINOPHEN 500 MG
975 TABLET ORAL ONCE
Refills: 0 | Status: COMPLETED | OUTPATIENT
Start: 2022-07-26 | End: 2022-07-26

## 2022-07-26 RX ADMIN — Medication 975 MILLIGRAM(S): at 19:06

## 2022-07-26 NOTE — ED PROVIDER NOTE - CLINICAL SUMMARY MEDICAL DECISION MAKING FREE TEXT BOX
Tiffany HERMAN: 41F no [facility]  recently seen w c/f PUL/ miscarriage, returns to ED today for repeat beta check, other than a mild headache that she attributes to stress, she has no acute complaints, otherwise feels physically well. No vaginal bleeding, discharge or lower abdominal pain exam vss non toxic PE as above, here for beta check, will send, give APAP for mild headache, discuss w OB, likely dc thereafter.

## 2022-07-26 NOTE — ED ADULT TRIAGE NOTE - CHIEF COMPLAINT QUOTE
Pt arrives ambulatory to triage for f/u blood work. Pt seen at this E.D. on 7/19 for a miscarriage. Denies vaginal bleeding currently. Denies PMH.

## 2022-07-26 NOTE — ED PROVIDER NOTE - PATIENT PORTAL LINK FT
You can access the FollowMyHealth Patient Portal offered by [facility] by registering at the following website: [email or web address]. By joining [employer]’s FollowMyHealth portal, you will also be able to view your health information using other applications (apps) compatible with our system.

## 2022-07-26 NOTE — ED PROVIDER NOTE - PHYSICAL EXAMINATION
Tiffany HERMAN:  VITALS: Initial triage and subsequent vitals have been reviewed by me.  GEN APPEARANCE: WDWN, alert and cooperative, non-toxic appearing and in NAD  HEAD: Atraumatic, normocephalic   EYES: PERRLa, EOMI, vision grossly intact.   EARS: Gross hearing intact.   NOSE: No nasal discharge, no external evidence of epistaxis.   NECK: Supple  CV: RRR, S1S2, no c/r/m/g. No cyanosis. Extremities warm, well perfused. Cap refill <2 seconds. No bruits.   LUNGS: CTAB. No wheezing. No rales. No rhonchi. No diminished breath sounds.   ABDOMEN: Soft, NTND. No guarding or rebound. No masses.   MSK/EXT: Spine appears normal, no spine point tenderness. No CVA ttp. Normal muscular development. Pelvis stable. No obvious joint or bony deformity, no peripheral edema.   NEURO: Alert, follows commands. Weight bearing normal. Speech normal. Sensation and motor normal x4 extremities.   SKIN: Normal color for race, warm, dry and intact. No evidence of rash.  PSYCH: Normal mood and affect.

## 2022-07-26 NOTE — ED PROVIDER NOTE - OBJECTIVE STATEMENT
Tiffany HERMAN: 41F no [facility]  recently seen w c/f PUL/ miscarriage, returns to ED today for repeat beta check, other than a mild headache that she attributes to stress, she has no acute complaints, otherwise feels physically well. No vaginal bleeding, discharge or lower abdominal pain. No urinary symptoms. No fever. No CP SOB or AUGUSTIN.

## 2022-07-26 NOTE — CHART NOTE - NSCHARTNOTEFT_GEN_A_CORE
Pt being followed by GYN team for pregnancy of unknown location with downtrending HCG. Today presenting for followup, now with HCG <5. Pt no longer pregnant with no GYN concerns. Will no longer be followed by GYN team.     Angeles De La Rosa, PGY-3  d/w Dr Rivas

## 2022-07-26 NOTE — ED PROVIDER NOTE - NSFOLLOWUPINSTRUCTIONS_ED_ALL_ED_FT
Thank you for visiting our Emergency Department, it has been a pleasure taking part in your healthcare.    Your discharge diagnosis is: lab screening test  Please take all discharge medications as indicated below:  Please continue all medications as rx'd by your PMD.  Please follow up with your PMD within x48 hours.  A copy of resulted labs, imaging, and findings have been provided to you.   You have had a detailed discussion with your provider regarding your diagnosis, care management and discharge planning including, but not limited to: return precautions, follow up visits with existing or new providers, new prescriptions and/or medication changes, wound and/or splint/cast care or other care   aspects specific to your diagnosis and treatment. You have been given the opportunity to have your questions answered. At this time you have been deemed stable and fit for discharge.  Return precautions to the Emergency Department include but are not limited to: unrelenting nausea, vomiting, fever, chills, chest pain, shortness of breath, dizziness, chest or abdominal pain, worsening back pain, syncope, blood in urine or stool, headache that doesn't resolve, numbness or tingling, loss of sensation, loss of motor function, or any other concerning symptoms.  Please follow up with OBGYN within x48 hours.

## 2022-07-27 NOTE — ED POST DISCHARGE NOTE - ADDITIONAL DOCUMENTATION
pt called about the work note which did not clear her for work. told pt that the ED can only exempt her from work and she has to f/u with PCP to clear.

## 2022-07-27 NOTE — ED POST DISCHARGE NOTE - REASON FOR FOLLOW-UP
Patient called was given work note for 2 days but date was written wrong. Gave patient work note X 2 days and emailed to [email or web address]

## 2023-10-06 NOTE — ED PROVIDER NOTE - CERVIX
closed/non tender Cephalexin Pregnancy And Lactation Text: This medication is Pregnancy Category B and considered safe during pregnancy.  It is also excreted in breast milk but can be used safely for shorter doses.

## 2024-11-08 NOTE — ED ADULT TRIAGE NOTE - NS ED NURSE BANDS TYPE
11/07/24 2046   Encounter Summary   Encounter Overview/Reason Spiritual/Emotional Needs   Service Provided For Patient   Referral/Consult From Nurse   Support System Spouse   Last Encounter  11/07/24  ( visited and prayed with pt)   Complexity of Encounter Moderate   Begin Time 1925   End Time  1945   Total Time Calculated 20 min   Spiritual/Emotional needs   Type Spiritual Support   Grief, Loss, and Adjustments   Type Adjustment to illness   Assessment/Intervention/Outcome   Assessment Coping;Questioning carmen   Intervention Active listening;Discussed illness injury and it’s impact;Prayer (assurance of)/[place]   Outcome Comfort;Expressed Gratitude        Name band;